# Patient Record
Sex: FEMALE | Race: WHITE | NOT HISPANIC OR LATINO | Employment: UNEMPLOYED | URBAN - NONMETROPOLITAN AREA
[De-identification: names, ages, dates, MRNs, and addresses within clinical notes are randomized per-mention and may not be internally consistent; named-entity substitution may affect disease eponyms.]

---

## 2017-10-05 ENCOUNTER — OFFICE VISIT (OUTPATIENT)
Dept: OBSTETRICS AND GYNECOLOGY | Facility: CLINIC | Age: 22
End: 2017-10-05

## 2017-10-05 VITALS
SYSTOLIC BLOOD PRESSURE: 110 MMHG | HEIGHT: 62 IN | WEIGHT: 180 LBS | BODY MASS INDEX: 33.13 KG/M2 | DIASTOLIC BLOOD PRESSURE: 70 MMHG

## 2017-10-05 DIAGNOSIS — N93.0 POSTCOITAL BLEEDING: Primary | ICD-10-CM

## 2017-10-05 LAB
CANDIDA ALBICANS: NEGATIVE
GARDNERELLA VAGINALIS: POSITIVE
TRICHOMONAS VAGINALIS PCR: NEGATIVE

## 2017-10-05 PROCEDURE — 87510 GARDNER VAG DNA DIR PROBE: CPT | Performed by: NURSE PRACTITIONER

## 2017-10-05 PROCEDURE — 87491 CHLMYD TRACH DNA AMP PROBE: CPT | Performed by: NURSE PRACTITIONER

## 2017-10-05 PROCEDURE — 87591 N.GONORRHOEAE DNA AMP PROB: CPT | Performed by: NURSE PRACTITIONER

## 2017-10-05 PROCEDURE — 99202 OFFICE O/P NEW SF 15 MIN: CPT | Performed by: NURSE PRACTITIONER

## 2017-10-05 PROCEDURE — 87660 TRICHOMONAS VAGIN DIR PROBE: CPT | Performed by: NURSE PRACTITIONER

## 2017-10-05 PROCEDURE — 87480 CANDIDA DNA DIR PROBE: CPT | Performed by: NURSE PRACTITIONER

## 2017-10-05 RX ORDER — METRONIDAZOLE 500 MG/1
500 TABLET ORAL 2 TIMES DAILY
Qty: 14 TABLET | Refills: 0 | Status: SHIPPED | OUTPATIENT
Start: 2017-10-05 | End: 2017-10-12

## 2017-10-05 NOTE — PROGRESS NOTES
"Subjective   History of Present Illness    Veronica Garza is a 21 y.o. female who presents with c/o postcoital bleeding x 1.5 months. She has been sexually active with new partner for 2 months, was last tested for chlamydia/gonorrhea 3 months ago. She is unsure of her partner's sexual history. She notes that the bleeding is very light after intercourse.       Current contraception: OCP (estrogen/progesterone)  Sexually active?: Yes  Postmenopausal?: No  HRT?: no  Hysterectomy?: no    /70  Ht 62\" (157.5 cm)  Wt 180 lb (81.6 kg)  LMP 2017 (Exact Date)  Breastfeeding? No  BMI 32.92 kg/m2    Past Medical History:   Diagnosis Date   • Cough    • Rhinitis    • Upper respiratory infection        No past surgical history on file.    The following portions of the patient's history were reviewed and updated as appropriate: allergies, current medications, past family history, past medical history, past social history, past surgical history and problem list.    Review of Systems    Constitutional:  No fatigue, No weight loss, No weight gain, No fever and No chills   Respiratory: No dyspnea, No cough, No hemopytysis, No wheezing and No pleuritic pain   Cardiovascular: No chest pain, No palpitations, No arrhythmia, No orthopnea, No noctural dyspnea, No edema and No claudication   Breasts: No discharge from nipple, No breast tenderness and No breast mass   Gastrointestinal: No loss of appetite, No dysphagia, No abdominal pain, No nausea, No vomiting, No change in bowel habits, No diarrhea, No constipation and No blood in stool   Genitourinary: No increased frequency of urination, No dysuria, No hematuria, No nocturia, No urinary incontinence, No vaginal discharge, abnormal vaginal bleeding, No pelvic pain, No menstrual problem and No menopausal problem   Skin: No skin rash, No skin lesion, No dry skin, No pruritus and No nail problem   Neurologic: No headache, No dizziness, No lightheadedness, No " syncope, No vertigo, No weakness, No numbness, No tremor and No paresthesia   Psychiatric: No difficulty sleeping, No mood swings, No feeling anxious, No confusion and No memory loss          Objective   Physical Exam    General:  Alert and oriented x 3, Cooperative, Well developed & well nourished and No acute distress   Genitourinary: Vulva normal, vaginal mucosa normal, bladder nondistended and nontender, cervix normal, uterus normal size and nontender, no adnexal/pelvic tenderness or masses, Bartholin's/[place]/Urethral gland WNL, vaginal discharge   Skin: Skin warm and dry and Pattern of hair growth normal       Assessment/Plan   Veronica was seen today for personal problem.    Diagnoses and all orders for this visit:    Postcoital bleeding  -     Gardnerella vaginalis, Trichomonas vaginalis, Candida albicans, PCR - Swab, Vagina  -     Chlamydia trachomatis, Neisseria gonorrhoeae, PCR - Swab, Vagina      All questions answered.  Will rule out vaginal infections.  Follow up if symptoms persist or worsen.

## 2017-10-07 LAB
C TRACH RRNA CVX QL NAA+PROBE: NOT DETECTED
N GONORRHOEA RRNA SPEC QL NAA+PROBE: NOT DETECTED

## 2018-03-15 ENCOUNTER — OFFICE VISIT (OUTPATIENT)
Dept: FAMILY MEDICINE CLINIC | Facility: CLINIC | Age: 23
End: 2018-03-15

## 2018-03-15 VITALS
DIASTOLIC BLOOD PRESSURE: 64 MMHG | WEIGHT: 218.25 LBS | BODY MASS INDEX: 40.16 KG/M2 | HEIGHT: 62 IN | HEART RATE: 88 BPM | OXYGEN SATURATION: 98 % | SYSTOLIC BLOOD PRESSURE: 110 MMHG

## 2018-03-15 DIAGNOSIS — M54.6 ACUTE BILATERAL THORACIC BACK PAIN: ICD-10-CM

## 2018-03-15 DIAGNOSIS — F41.9 ANXIETY: Primary | ICD-10-CM

## 2018-03-15 PROCEDURE — 99203 OFFICE O/P NEW LOW 30 MIN: CPT | Performed by: FAMILY MEDICINE

## 2018-03-15 RX ORDER — BUSPIRONE HYDROCHLORIDE 5 MG/1
5 TABLET ORAL 2 TIMES DAILY
Qty: 60 TABLET | Refills: 3 | Status: SHIPPED | OUTPATIENT
Start: 2018-03-15 | End: 2019-03-22

## 2018-03-15 RX ORDER — MELOXICAM 15 MG/1
15 TABLET ORAL DAILY
Qty: 30 TABLET | Refills: 0 | Status: SHIPPED | OUTPATIENT
Start: 2018-03-15 | End: 2019-03-22

## 2018-03-15 RX ORDER — HYDROXYZINE HYDROCHLORIDE 25 MG/1
25 TABLET, FILM COATED ORAL 3 TIMES DAILY PRN
Qty: 90 TABLET | Refills: 3 | Status: SHIPPED | OUTPATIENT
Start: 2018-03-15 | End: 2019-03-22

## 2018-03-15 NOTE — PROGRESS NOTES
Subjective:     Veronica Garza is a 22 y.o. female who presents for initial evaluation for back pain  Back Pain  Patient presents for evaluation of low back problems. Symptoms have been present for 1 week and include pain in upper back . Initial inciting event: none. Symptoms are worse at random times throughout the day. Alleviating factors identifiable by the patient are popping her back, or laying flat. Aggravating factors identifiable by the patient are bending/using arms. Treatments initiated by the patient: Ibuprofen, and heat. Previous work up: none. Previous treatments: none.    ANXIETY HPI:  Veronica Garza is a 22 y.o. female who presents for initial evaluation of anxiety.       General:  Onset of symptoms: were gradual in onset  Duration of symptoms: Several year(s)  Concerns/Stressors: Work   Current diagnosis: anxiety disorder    Anxiety Symptoms:   Feeling anxious, Feeling worried, Sleep disturbance and Difficulty concentrating  General Anxiety Disorder Symptoms:   Excessive anxiety or worry , Experiencing anxiety lasting more than 6 months, Anxiety is out of proportion to events, Worry is pervasive or difficult to control and Difficulty concentrating  Panic Attack Symptoms:   none in 1 month. Heart races, difficulty with speech and crying. *  Panic Disorder Symptoms:   Worry about panic attacks. *  Specific Anxiety Symptoms:   Fear and anxiety in social situations and Fear of specific object or situation  Associated Symptoms:   Lack of energy and Difficulty functioning at home or work  Comorbid Conditions:   Marijuanna use       Preventative:  Over the past 2 weeks, have you felt down, depressed, or hopeless?Yes   Over the past 2 weeks, have you felt little interest or pleasure in doing things?Yes  Clinical depression screening refused by patient.No     On osteoporosis therapy?Not Indicated     Past Medical Hx:  Past Medical History:   Diagnosis Date   • Cough    • Rhinitis    • Upper  respiratory infection        Past Surgical Hx:  No past surgical history on file.    Health Maintenance:  Health Maintenance   Topic Date Due   • HPV VACCINES (1 of 3 - Female 3 Dose Series) 10/14/2008   • TDAP/TD VACCINES (1 - Tdap) 10/14/2016   • INFLUENZA VACCINE  08/01/2017       Current Meds:    Current Outpatient Prescriptions:   •  norgestrel-ethinyl estradiol (LOW-OGESTREL,CRYSELLE) 0.3-30 MG-MCG per tablet, Take 1 tablet by mouth Daily., Disp: , Rfl:     Allergies:  Review of patient's allergies indicates no known allergies.    Family Hx:  No family history on file.     Social History:  Social History     Social History   • Marital status: Single     Spouse name: N/A   • Number of children: N/A   • Years of education: N/A     Occupational History   • Not on file.     Social History Main Topics   • Smoking status: Never Smoker   • Smokeless tobacco: Never Used   • Alcohol use No   • Drug use: No   • Sexual activity: Yes     Partners: Male     Birth control/ protection: OCP     Other Topics Concern   • Not on file     Social History Narrative   • No narrative on file       Review of Systems  General:negative for - chills, fatigue, fever, hot flashes, malaise, night sweats, weight gain or weight loss  Psychological: negative for - depression, sleep disturbances or suicidal ideation. Positive for anxiety  Ophthalmic: negative for - blurry vision or loss of vision  ENT: negative for - hearing change, nasal congestion or sore throat  Hematological and Lymphatic: negative for - jaundice  Endocrine: negative for - hair pattern changes, skin changes or temperature intolerance  Respiratory: no cough, shortness of breath, or wheezing  Cardiovascular: no chest pain, edema or dyspnea on exertion  Gastrointestinal: no  Nausea/vomiting, abdominal pain, change in bowel habits, or black or bloody stools  Genito-Urinary: no dysuria, trouble voiding, or hematuria  Musculoskeletal: positive for back pain   Neurological:  "negative for - dizziness, headaches, numbness/tingling or seizures  Dermatological: negative for rash and skin lesion changes      Objective:     /64 (BP Location: Left arm, Patient Position: Sitting, Cuff Size: Adult)   Pulse 88   Ht 157.5 cm (62\")   Wt 99 kg (218 lb 4 oz)   SpO2 98%   BMI 39.92 kg/m²     Physical Exam     General Appearance:    Alert, cooperative, no distress, appears stated age   Head:    Normocephalic, without obvious abnormality, atraumatic   Eyes:    PERRL, conjunctiva/corneas clear, EOM's intact   Ears:    Normal external ear canals, both ears   Nose:   Nares normal, septum midline, mucosa normal, no drainage     or sinus tenderness   Throat:   Lips, mucosa, and tongue normal; teeth and gums normal   Neck:   Supple, symmetrical, trachea midline, no adenopathy;     thyroid:  no enlargement/tenderness/nodules; no carotid    bruit   Back:     Symmetric, no curvature, ROM normal, no CVA tenderness   Lungs:     Clear to auscultation bilaterally, respirations unlabored   Chest Wall:    No tenderness or deformity    Heart:    Regular rate and rhythm, S1 and S2 normal, no murmur, rub    or gallop   Abdomen:     Soft, non-tender, bowel sounds active all four quadrants,     no masses, no organomegaly   Extremities:   Extremities normal, atraumatic, no cyanosis or edema   Pulses:   2+ and symmetric all extremities   Skin:   Skin color, texture, turgor normal, no rashes or lesions   Lymph nodes:   Cervical, supraclavicular, and axillary nodes normal   Neurologic:   CNII-XII grossly intact              Assessment/Plan:      Diagnosis Plan   1. Anxiety  CBC & Differential    Comprehensive Metabolic Panel    TSH    T4, Free   2. Acute bilateral thoracic back pain            Follow-up:     Return in about 6 weeks (around 4/26/2018).    Goals     • Less pain     • Less sadness/anxiety             Preventative:  Recommended HPV, TDAp and Influenza vaccine: Patient declined     Does not smoke " cigarretts. Smokes THC. Counseling given  does not drink  eat more fruits and vegetables, decrease soda or juice intake, increase water intake, increase physical activity, reduce screen time, reduce portion size, cut out extra servings, reduce fast food intake, family to eat at dinner table more often, keep TV off during meals, plan meals, eat breakfast and have 3 meals a day    RISK SCORE: 4    Signature   Elisa Garner M.D.  Family Medicine Resident, PGY III  [address] [place] [address]  [phone]          This document has been electronically signed by Elisa Garner MD on March 15, 2018 4:33 PM

## 2018-03-20 ENCOUNTER — APPOINTMENT (OUTPATIENT)
Dept: LAB | Facility: HOSPITAL | Age: 23
End: 2018-03-20

## 2018-03-20 LAB
ALBUMIN SERPL-MCNC: 4 G/DL (ref 3.4–4.8)
ALBUMIN/GLOB SERPL: 1.2 G/DL (ref 1.1–1.8)
ALP SERPL-CCNC: 89 U/L (ref 38–126)
ALT SERPL W P-5'-P-CCNC: 28 U/L (ref 9–52)
ANION GAP SERPL CALCULATED.3IONS-SCNC: 13 MMOL/L (ref 5–15)
AST SERPL-CCNC: 50 U/L (ref 14–36)
BASOPHILS # BLD AUTO: 0.04 10*3/MM3 (ref 0–0.2)
BASOPHILS NFR BLD AUTO: 0.5 % (ref 0–2)
BILIRUB SERPL-MCNC: 0.3 MG/DL (ref 0.2–1.3)
BUN BLD-MCNC: 15 MG/DL (ref 7–21)
BUN/CREAT SERPL: 25.4 (ref 7–25)
CALCIUM SPEC-SCNC: 9.4 MG/DL (ref 8.4–10.2)
CHLORIDE SERPL-SCNC: 103 MMOL/L (ref 95–110)
CO2 SERPL-SCNC: 25 MMOL/L (ref 22–31)
CREAT BLD-MCNC: 0.59 MG/DL (ref 0.5–1)
DEPRECATED RDW RBC AUTO: 39.9 FL (ref 36.4–46.3)
EOSINOPHIL # BLD AUTO: 0.18 10*3/MM3 (ref 0–0.7)
EOSINOPHIL NFR BLD AUTO: 2.4 % (ref 0–7)
ERYTHROCYTE [DISTWIDTH] IN BLOOD BY AUTOMATED COUNT: 13.2 % (ref 11.5–14.5)
GFR SERPL CREATININE-BSD FRML MDRD: 130 ML/MIN/1.73 (ref 60–165)
GLOBULIN UR ELPH-MCNC: 3.3 GM/DL (ref 2.3–3.5)
GLUCOSE BLD-MCNC: 84 MG/DL (ref 60–100)
HCT VFR BLD AUTO: 39.7 % (ref 35–45)
HGB BLD-MCNC: 13 G/DL (ref 12–15.5)
IMM GRANULOCYTES # BLD: 0.02 10*3/MM3 (ref 0–0.02)
IMM GRANULOCYTES NFR BLD: 0.3 % (ref 0–0.5)
LYMPHOCYTES # BLD AUTO: 1.77 10*3/MM3 (ref 0.6–4.2)
LYMPHOCYTES NFR BLD AUTO: 23.5 % (ref 10–50)
MCH RBC QN AUTO: 27 PG (ref 26.5–34)
MCHC RBC AUTO-ENTMCNC: 32.7 G/DL (ref 31.4–36)
MCV RBC AUTO: 82.4 FL (ref 80–98)
MONOCYTES # BLD AUTO: 0.45 10*3/MM3 (ref 0–0.9)
MONOCYTES NFR BLD AUTO: 6 % (ref 0–12)
NEUTROPHILS # BLD AUTO: 5.07 10*3/MM3 (ref 2–8.6)
NEUTROPHILS NFR BLD AUTO: 67.3 % (ref 37–80)
PLATELET # BLD AUTO: 337 10*3/MM3 (ref 150–450)
PMV BLD AUTO: 9.1 FL (ref 8–12)
POTASSIUM BLD-SCNC: 3.7 MMOL/L (ref 3.5–5.1)
PROT SERPL-MCNC: 7.3 G/DL (ref 6.3–8.6)
RBC # BLD AUTO: 4.82 10*6/MM3 (ref 3.77–5.16)
SODIUM BLD-SCNC: 141 MMOL/L (ref 137–145)
T4 FREE SERPL-MCNC: 0.83 NG/DL (ref 0.78–2.19)
TSH SERPL DL<=0.05 MIU/L-ACNC: 1.71 MIU/ML (ref 0.46–4.68)
WBC NRBC COR # BLD: 7.53 10*3/MM3 (ref 3.2–9.8)

## 2018-03-20 PROCEDURE — 85025 COMPLETE CBC W/AUTO DIFF WBC: CPT | Performed by: FAMILY MEDICINE

## 2018-03-20 PROCEDURE — 80053 COMPREHEN METABOLIC PANEL: CPT | Performed by: FAMILY MEDICINE

## 2018-03-20 PROCEDURE — 84439 ASSAY OF FREE THYROXINE: CPT | Performed by: FAMILY MEDICINE

## 2018-03-20 PROCEDURE — 84443 ASSAY THYROID STIM HORMONE: CPT | Performed by: FAMILY MEDICINE

## 2018-03-30 ENCOUNTER — TELEPHONE (OUTPATIENT)
Dept: FAMILY MEDICINE CLINIC | Facility: CLINIC | Age: 23
End: 2018-03-30

## 2018-03-30 NOTE — TELEPHONE ENCOUNTER
Pt called said that she is wanting to to know about lab results.    Said that you can reach her at [phone]

## 2018-08-27 ENCOUNTER — TELEPHONE (OUTPATIENT)
Dept: FAMILY MEDICINE CLINIC | Facility: CLINIC | Age: 23
End: 2018-08-27

## 2019-03-22 ENCOUNTER — CLINICAL SUPPORT (OUTPATIENT)
Dept: OBSTETRICS AND GYNECOLOGY | Facility: CLINIC | Age: 24
End: 2019-03-22

## 2019-03-22 ENCOUNTER — OFFICE VISIT (OUTPATIENT)
Dept: OBSTETRICS AND GYNECOLOGY | Facility: CLINIC | Age: 24
End: 2019-03-22

## 2019-03-22 VITALS
BODY MASS INDEX: 43.79 KG/M2 | SYSTOLIC BLOOD PRESSURE: 108 MMHG | WEIGHT: 238 LBS | HEIGHT: 62 IN | DIASTOLIC BLOOD PRESSURE: 70 MMHG

## 2019-03-22 DIAGNOSIS — Z30.013 INITIATION OF DEPO PROVERA: ICD-10-CM

## 2019-03-22 DIAGNOSIS — Z30.42 SURVEILLANCE FOR DEPO-PROVERA CONTRACEPTION: Primary | ICD-10-CM

## 2019-03-22 DIAGNOSIS — Z01.419 ENCOUNTER FOR ANNUAL ROUTINE GYNECOLOGICAL EXAMINATION: Primary | ICD-10-CM

## 2019-03-22 PROCEDURE — 99395 PREV VISIT EST AGE 18-39: CPT | Performed by: NURSE PRACTITIONER

## 2019-03-22 PROCEDURE — 96372 THER/PROPH/DIAG INJ SC/IM: CPT | Performed by: NURSE PRACTITIONER

## 2019-03-22 PROCEDURE — G0123 SCREEN CERV/VAG THIN LAYER: HCPCS | Performed by: NURSE PRACTITIONER

## 2019-03-22 PROCEDURE — 88141 CYTOPATH C/V INTERPRET: CPT | Performed by: PATHOLOGY

## 2019-03-22 RX ORDER — MEDROXYPROGESTERONE ACETATE 150 MG/ML
150 INJECTION, SUSPENSION INTRAMUSCULAR
Qty: 1 EACH | Refills: 4 | Status: SHIPPED | OUTPATIENT
Start: 2019-03-22 | End: 2019-12-03

## 2019-03-22 RX ORDER — MEDROXYPROGESTERONE ACETATE 150 MG/ML
150 INJECTION, SUSPENSION INTRAMUSCULAR ONCE
Status: COMPLETED | OUTPATIENT
Start: 2019-03-22 | End: 2019-03-22

## 2019-03-22 RX ADMIN — MEDROXYPROGESTERONE ACETATE 150 MG: 150 INJECTION, SUSPENSION INTRAMUSCULAR at 12:00

## 2019-03-22 NOTE — PROGRESS NOTES
Subjective   Veronica Garza is a 23 y.o. Annual gyn exam, desires Depo    LMP: 19  Pap: never    Sometimes forgets to take OCP and desires Depo injections.  She has take several home pregnancy tests and they were all negative.  Periods usually regular every month, flow light to moderate, painful menstrual cramps relieved by ibuprofen, changing pad every 2 hours for cleanliness purposes,  last 4-7 days.      Gynecologic Exam   The patient's pertinent negatives include no genital itching, genital lesions, genital odor, genital rash, missed menses, pelvic pain, vaginal bleeding or vaginal discharge. The patient is experiencing no pain. She is not pregnant. Pertinent negatives include no abdominal pain, anorexia, back pain, chills, constipation, diarrhea, discolored urine, dysuria, fever, flank pain, frequency, headaches, hematuria, joint pain, joint swelling, nausea, painful intercourse, rash, sore throat, urgency or vomiting. She is sexually active. No, her partner does not have an STD. She uses oral contraceptives for contraception. Her menstrual history has been regular. Her past medical history is significant for vaginosis. There is no history of an abdominal surgery, a  section, an ectopic pregnancy, endometriosis, a gynecological surgery, herpes simplex, menorrhagia, metrorrhagia, miscarriage, ovarian cysts, perineal abscess, PID, an STD or a terminated pregnancy.       The following portions of the patient's history were reviewed and updated as appropriate: allergies, current medications, past family history, past medical history, past social history, past surgical history and problem list.    Review of Systems   Constitutional: Negative for chills, fatigue, fever and unexpected weight change.   HENT: Negative for sore throat.    Respiratory: Negative for apnea, chest tightness and shortness of breath.    Cardiovascular: Negative for chest pain, palpitations and leg swelling.    Gastrointestinal: Negative for abdominal pain, anorexia, constipation, diarrhea, nausea and vomiting.   Genitourinary: Negative for difficulty urinating, dyspareunia, dysuria, enuresis, flank pain, frequency, genital sores, hematuria, menorrhagia, menstrual problem, missed menses, pelvic pain, urgency, vaginal bleeding, vaginal discharge and vaginal pain.   Musculoskeletal: Negative for back pain and joint pain.   Skin: Negative for rash.   Neurological: Negative for headaches.   Psychiatric/Behavioral: Negative for sleep disturbance.         Objective   Physical Exam   Constitutional: She is oriented to person, place, and time. Vital signs are normal. She appears well-developed and well-nourished. No distress.   Neck: No thyromegaly present.   Cardiovascular: Normal rate, regular rhythm, normal heart sounds and intact distal pulses.   Pulmonary/Chest: Effort normal and breath sounds normal. Right breast exhibits no inverted nipple, no mass, no nipple discharge, no skin change and no tenderness. Left breast exhibits no inverted nipple, no mass, no nipple discharge, no skin change and no tenderness. Breasts are symmetrical.   Abdominal: Soft. Bowel sounds are normal. She exhibits no distension. There is no tenderness.   Genitourinary: Vagina normal and uterus normal. No breast discharge or bleeding. Pelvic exam was performed with patient supine. There is no rash, tenderness, lesion or injury on the right labia. There is no rash, tenderness, lesion or injury on the left labia. Cervix exhibits no motion tenderness, no discharge and no friability. Right adnexum displays no mass, no tenderness and no fullness. Left adnexum displays no mass, no tenderness and no fullness.   Genitourinary Comments: Pap obtained.   Lymphadenopathy:     She has no axillary adenopathy.        Right: No inguinal adenopathy present.        Left: No inguinal adenopathy present.   Neurological: She is alert and oriented to person, place, and  time.   Skin: Skin is warm, dry and intact. She is not diaphoretic.   Psychiatric: She has a normal mood and affect. Her speech is normal and behavior is normal.   Nursing note and vitals reviewed.        Assessment/Plan   Veronica was seen today for menstrual problem.    Diagnoses and all orders for this visit:    Encounter for annual routine gynecological examination  -     Liquid-based Pap Smear, Screening    Initiation of Depo Provera  -     medroxyPROGESTERone (DEPO-PROVERA) 150 MG/ML injection; Inject 1 mL into the appropriate muscle as directed by prescriber Every 3 (Three) Months.    RBA of Depo.  Pt understands risks/benefits and agrees to hormonal medication regimen.  Pt educated and encouraged to continue monthly SBEs.  Pap letter will be mailed out in about 2 weeks.  RTC every 3 months for injection appts, then in one year for annual gyn exam or sooner if needed.  All pt questions and concerns addressed.

## 2019-03-27 LAB
GEN CATEG CVX/VAG CYTO-IMP: ABNORMAL
LAB AP CASE REPORT: ABNORMAL
LAB AP GYN ADDITIONAL INFORMATION: ABNORMAL
PATH INTERP SPEC-IMP: ABNORMAL
STAT OF ADQ CVX/VAG CYTO-IMP: ABNORMAL

## 2019-04-24 ENCOUNTER — PROCEDURE VISIT (OUTPATIENT)
Dept: OBSTETRICS AND GYNECOLOGY | Facility: CLINIC | Age: 24
End: 2019-04-24

## 2019-04-24 VITALS
WEIGHT: 238 LBS | SYSTOLIC BLOOD PRESSURE: 122 MMHG | BODY MASS INDEX: 43.79 KG/M2 | DIASTOLIC BLOOD PRESSURE: 85 MMHG | HEIGHT: 62 IN

## 2019-04-24 DIAGNOSIS — R87.611 ATYPICAL SQUAMOUS CELLS CANNOT EXCLUDE HIGH GRADE SQUAMOUS INTRAEPITHELIAL LESION ON CYTOLOGIC SMEAR OF CERVIX (ASC-H): Primary | ICD-10-CM

## 2019-04-24 PROCEDURE — 57452 EXAM OF CERVIX W/SCOPE: CPT | Performed by: OBSTETRICS & GYNECOLOGY

## 2019-04-24 NOTE — PROGRESS NOTES
Colposcopy    Date of procedure:  4/24/2019    Risks and benefits discussed? yes  All questions answered? yes  Consents given by the patient  Written consent obtained? yes    Local anesthesia used:  no    Pre-op indication: ASC-H  Procedure documentation:    The cervix was next bathed in acetic acid. The cervix was  viewed colposcopically with white and green light.     The findings were as follows:      The transformation zone was able to be seen adequately.    No visible lesions    Ectocervical biopsies not obtained..    An ECC was not performed.      Colposcopic Impression: 1. Adequate colposcopy  2. Colposcopic findings are inconsistent with PAP       Plan: Discussed findings and will need repeat pap/colpo every six months.      The patient received education regarding abnormal pap smears and the role of HPV with cervical dysplasia and cervical cancer.  We discussed that tobacco use increases the risks of persistently abnormal pap smears and invasive cervical cancer.  We have discussed that unprotected sexual intercourse, especially with multiple sexual partners increases the risks of HPV acquisition and cervical cancer.  I have recommended compliance with pap smear follow up and regular use of condoms.  She voices understanding.    This note was electronically signed.  Arpita Hung MD   April 24, 2019

## 2019-06-12 ENCOUNTER — CLINICAL SUPPORT (OUTPATIENT)
Dept: OBSTETRICS AND GYNECOLOGY | Facility: CLINIC | Age: 24
End: 2019-06-12

## 2019-06-12 DIAGNOSIS — Z30.42 SURVEILLANCE FOR DEPO-PROVERA CONTRACEPTION: Primary | ICD-10-CM

## 2019-06-12 PROCEDURE — 96372 THER/PROPH/DIAG INJ SC/IM: CPT | Performed by: OBSTETRICS & GYNECOLOGY

## 2019-06-12 RX ORDER — MEDROXYPROGESTERONE ACETATE 150 MG/ML
150 INJECTION, SUSPENSION INTRAMUSCULAR ONCE
Status: COMPLETED | OUTPATIENT
Start: 2019-06-12 | End: 2019-06-12

## 2019-06-12 RX ADMIN — MEDROXYPROGESTERONE ACETATE 150 MG: 150 INJECTION, SUSPENSION INTRAMUSCULAR at 15:10

## 2019-08-21 ENCOUNTER — OFFICE VISIT (OUTPATIENT)
Dept: OBSTETRICS AND GYNECOLOGY | Facility: CLINIC | Age: 24
End: 2019-08-21

## 2019-08-21 VITALS
SYSTOLIC BLOOD PRESSURE: 109 MMHG | DIASTOLIC BLOOD PRESSURE: 77 MMHG | WEIGHT: 242 LBS | HEIGHT: 62 IN | BODY MASS INDEX: 44.53 KG/M2

## 2019-08-21 DIAGNOSIS — N92.1 BREAKTHROUGH BLEEDING ON DEPO-PROVERA: ICD-10-CM

## 2019-08-21 DIAGNOSIS — R87.611 ATYPICAL SQUAMOUS CELLS CANNOT EXCLUDE HIGH GRADE SQUAMOUS INTRAEPITHELIAL LESION ON CYTOLOGIC SMEAR OF CERVIX (ASC-H): Primary | ICD-10-CM

## 2019-08-21 PROCEDURE — 88141 CYTOPATH C/V INTERPRET: CPT | Performed by: PATHOLOGY

## 2019-08-21 PROCEDURE — 99213 OFFICE O/P EST LOW 20 MIN: CPT | Performed by: NURSE PRACTITIONER

## 2019-08-21 PROCEDURE — 87624 HPV HI-RISK TYP POOLED RSLT: CPT | Performed by: NURSE PRACTITIONER

## 2019-08-21 PROCEDURE — G0123 SCREEN CERV/VAG THIN LAYER: HCPCS | Performed by: NURSE PRACTITIONER

## 2019-08-21 RX ORDER — DOXYCYCLINE HYCLATE 100 MG
TABLET ORAL EVERY 12 HOURS SCHEDULED
COMMUNITY
End: 2020-01-07

## 2019-08-21 NOTE — PROGRESS NOTES
Subjective   Veronica Garza is a 23 y.o. Breakthrough bleeding on Depo-Provera, f/u pap smear     LMP: 03/19  Pap: 03/19, ASCUS- can't exclude HGSIL    Pt presents for follow up pap smear and breakthrough bleeding while on Depo Provera.  She reports vaginal bleeding has lasted about 10 days, but is starting to lighten up.        Vaginal Bleeding   The patient's primary symptoms include vaginal bleeding. The patient's pertinent negatives include no genital itching, genital lesions, genital odor, genital rash, missed menses, pelvic pain or vaginal discharge. This is a new problem. The current episode started 1 to 4 weeks ago. The problem occurs daily. The problem has been gradually improving. The patient is experiencing no pain. She is not pregnant. Pertinent negatives include no abdominal pain, anorexia, back pain, chills, constipation, diarrhea, discolored urine, dysuria, fever, flank pain, frequency, headaches, hematuria, joint pain, joint swelling, nausea, rash, sore throat, urgency or vomiting. The vaginal discharge was bloody. The vaginal bleeding is typical of menses. She has not been passing clots. She has not been passing tissue. She uses progestin injections for contraception. Her menstrual history has been irregular.       The following portions of the patient's history were reviewed and updated as appropriate: allergies, current medications, past family history, past medical history, past social history, past surgical history and problem list.    Review of Systems   Constitutional: Negative for chills, diaphoresis, fatigue, fever and unexpected weight change.   HENT: Negative for sore throat.    Respiratory: Negative for apnea, chest tightness and shortness of breath.    Cardiovascular: Negative for chest pain and palpitations.   Gastrointestinal: Negative for abdominal distention, abdominal pain, anorexia, constipation, diarrhea, nausea and vomiting.   Genitourinary: Positive for vaginal bleeding.  Negative for decreased urine volume, difficulty urinating, dysuria, enuresis, flank pain, frequency, genital sores, hematuria, menstrual problem, missed menses, pelvic pain, urgency, vaginal discharge and vaginal pain.   Musculoskeletal: Negative for back pain and joint pain.   Skin: Negative for rash.   Neurological: Negative for headaches.   Psychiatric/Behavioral: Negative for sleep disturbance.       Objective   Physical Exam   Constitutional: She is oriented to person, place, and time. Vital signs are normal. She appears well-developed and well-nourished. No distress.   Cardiovascular: Normal rate, regular rhythm and normal heart sounds.   Pulmonary/Chest: Effort normal and breath sounds normal.   Genitourinary: Rectum normal. Pelvic exam was performed with patient supine. There is no rash, tenderness, lesion or injury on the right labia. There is no rash, tenderness, lesion or injury on the left labia. Cervix exhibits no motion tenderness, no discharge and no friability. There is bleeding in the vagina.   Genitourinary Comments: Pap obtained   Neurological: She is alert and oriented to person, place, and time.   Skin: Skin is warm and dry. She is not diaphoretic.   Psychiatric: She has a normal mood and affect. Her behavior is normal.   Nursing note and vitals reviewed.        Assessment/Plan   Veronica was seen today for abnormal bleeding.    Diagnoses and all orders for this visit:    Atypical squamous cells cannot exclude high grade squamous intraepithelial lesion on cytologic smear of cervix (ASC-H)  -     Liquid-based Pap Smear, Screening    Breakthrough bleeding on Depo-Provera        Pt educated on breakthrough bleeding with Depo Provera, gave her premarin 14 day sample if she experiences another episode of bleeding.  Will send pap letter via mail, if abnormal we will call her.  RTC if symptoms worsen or fail to improve.

## 2019-08-30 LAB — HPV I/H RISK 4 DNA CVX QL PROBE+SIG AMP: NEGATIVE

## 2019-09-04 ENCOUNTER — CLINICAL SUPPORT (OUTPATIENT)
Dept: OBSTETRICS AND GYNECOLOGY | Facility: CLINIC | Age: 24
End: 2019-09-04

## 2019-09-04 DIAGNOSIS — Z30.42 SURVEILLANCE FOR DEPO-PROVERA CONTRACEPTION: Primary | ICD-10-CM

## 2019-09-04 PROCEDURE — 96372 THER/PROPH/DIAG INJ SC/IM: CPT | Performed by: NURSE PRACTITIONER

## 2019-09-04 RX ORDER — MEDROXYPROGESTERONE ACETATE 150 MG/ML
150 INJECTION, SUSPENSION INTRAMUSCULAR ONCE
Status: COMPLETED | OUTPATIENT
Start: 2019-09-04 | End: 2019-09-04

## 2019-09-04 RX ADMIN — MEDROXYPROGESTERONE ACETATE 150 MG: 150 INJECTION, SUSPENSION INTRAMUSCULAR at 14:58

## 2019-11-26 RX ORDER — MEDROXYPROGESTERONE ACETATE 150 MG/ML
150 INJECTION, SUSPENSION INTRAMUSCULAR
Qty: 1 ML | Refills: 3 | Status: SHIPPED | OUTPATIENT
Start: 2019-11-26 | End: 2019-12-03

## 2019-12-03 ENCOUNTER — OFFICE VISIT (OUTPATIENT)
Dept: OBSTETRICS AND GYNECOLOGY | Facility: CLINIC | Age: 24
End: 2019-12-03

## 2019-12-03 VITALS
SYSTOLIC BLOOD PRESSURE: 130 MMHG | DIASTOLIC BLOOD PRESSURE: 96 MMHG | WEIGHT: 251 LBS | BODY MASS INDEX: 46.19 KG/M2 | HEIGHT: 62 IN

## 2019-12-03 DIAGNOSIS — Z30.430 ENCOUNTER FOR IUD INSERTION: Primary | ICD-10-CM

## 2019-12-03 DIAGNOSIS — Z11.3 SCREENING FOR STD (SEXUALLY TRANSMITTED DISEASE): ICD-10-CM

## 2019-12-03 LAB
B-HCG UR QL: NEGATIVE
INTERNAL NEGATIVE CONTROL: NEGATIVE
INTERNAL POSITIVE CONTROL: POSITIVE
Lab: NORMAL

## 2019-12-03 PROCEDURE — 81025 URINE PREGNANCY TEST: CPT | Performed by: NURSE PRACTITIONER

## 2019-12-03 PROCEDURE — 58300 INSERT INTRAUTERINE DEVICE: CPT | Performed by: NURSE PRACTITIONER

## 2019-12-03 PROCEDURE — 87661 TRICHOMONAS VAGINALIS AMPLIF: CPT | Performed by: NURSE PRACTITIONER

## 2019-12-03 PROCEDURE — 87491 CHLMYD TRACH DNA AMP PROBE: CPT | Performed by: NURSE PRACTITIONER

## 2019-12-03 PROCEDURE — 87591 N.GONORRHOEAE DNA AMP PROB: CPT | Performed by: NURSE PRACTITIONER

## 2019-12-03 NOTE — PROGRESS NOTES
IUD Insertion NDC# [id number]    Pt is spotting frequently with Depo-Provera. Desires the Mirena. Negative pregnancy test today.  Urine GC today. Currently on menses; flow heavy the last 3-4 days.     Date of procedure:  12/3/2019    Risks and benefits discussed? yes  All questions answered? yes  Consents given by The patient  Written consent obtained? yes    Local anesthesia used:  no    Procedure documentation:    After verifying the patient had a low probability of being pregnant and met the criteria for insertion, a speculum has placed and the cervix was cleansed with an antiseptic solution.  The anterior lip of the cervix was grasped and the uterine cavity was gently sounded. There was mild difficulty passing the sound through the cervix.  Cervical dilation did not need to be performed prior to placing the IUD.  The uterus was anteverted and sounded to 7 cms. The Mirena was then prepared per the manufacturers instructions.    The Mirena was advanced to a point 2 cms from the fundus and then the arms were released from the sheath.  The device was advanced to the fundus and the device was released fully from the sheath.. The string was cut 3 cms in length.  Bleeding from the cervix was scant.    She tolerated the procedure without any difficulty.    Post procedure instructions: Call if any fever or excessive bleeding or pain.    Follow up needed: PRN for problems; return in 1 month of string check.     This note was electronically signed.    Jessenia Hernandez, CHARLIE  December 3, 2019

## 2019-12-04 LAB
C TRACH RRNA CVX QL NAA+PROBE: NEGATIVE
N GONORRHOEA RRNA SPEC QL NAA+PROBE: NEGATIVE
TRICHOMONAS VAGINALIS PCR: NEGATIVE

## 2020-01-07 ENCOUNTER — OFFICE VISIT (OUTPATIENT)
Dept: OBSTETRICS AND GYNECOLOGY | Facility: CLINIC | Age: 25
End: 2020-01-07

## 2020-01-07 VITALS
BODY MASS INDEX: 46.01 KG/M2 | HEIGHT: 62 IN | DIASTOLIC BLOOD PRESSURE: 80 MMHG | SYSTOLIC BLOOD PRESSURE: 130 MMHG | WEIGHT: 250 LBS

## 2020-01-07 DIAGNOSIS — Z30.431 ENCOUNTER FOR ROUTINE CHECKING OF INTRAUTERINE CONTRACEPTIVE DEVICE (IUD): Primary | ICD-10-CM

## 2020-01-07 PROCEDURE — 99212 OFFICE O/P EST SF 10 MIN: CPT | Performed by: NURSE PRACTITIONER

## 2020-01-07 NOTE — PROGRESS NOTES
Subjective   Veronica Garza is a 24 y.o. here for a IUD string check    Pt reports she has been bleeding every day since getting the Mirena. Some days she goes through several pads. She reports cramping associated with the vaginal bleeding.     Contraception   This is a new problem. The current episode started more than 1 month ago. The problem has been waxing and waning. Pertinent negatives include no abdominal pain, fatigue, fever, headaches, vomiting or weakness. Nothing aggravates the symptoms. She has tried nothing for the symptoms.       The following portions of the patient's history were reviewed and updated as appropriate: allergies, current medications, past family history, past medical history, past social history, past surgical history and problem list.    Review of Systems   Constitutional: Negative for fatigue and fever.   Gastrointestinal: Negative for abdominal pain and vomiting.   Genitourinary: Positive for menstrual problem. Negative for vaginal discharge and vaginal pain.        Cramping, bleeding every day with Mirena    Neurological: Negative for weakness.       Objective   Physical Exam   Constitutional: She is oriented to person, place, and time. She appears well-developed and well-nourished.   HENT:   Head: Normocephalic.   Neck: Normal range of motion.   Pulmonary/Chest: Effort normal. Right breast exhibits no inverted nipple, no mass, no nipple discharge, no skin change and no tenderness. Left breast exhibits no inverted nipple, no mass, no nipple discharge, no skin change and no tenderness.   Abdominal: Soft.   Genitourinary:   Genitourinary Comments: IUD string visible and at appropriate length. Small amount of blood noted.    Musculoskeletal: Normal range of motion.   Neurological: She is alert and oriented to person, place, and time.   Skin: Skin is warm and dry.   Psychiatric: She has a normal mood and affect. Her behavior is normal.   Nursing note and vitals  reviewed.        Assessment/Plan   Veronica was seen today for contraception.    Diagnoses and all orders for this visit:    Encounter for routine checking of intrauterine contraceptive device (IUD)        Ibuprofen can help with cramping and decrease amount of bleeding. Follow up in a month if vaginal bleeding everyday persist. Return as needed.

## 2020-02-04 ENCOUNTER — OFFICE VISIT (OUTPATIENT)
Dept: OBSTETRICS AND GYNECOLOGY | Facility: CLINIC | Age: 25
End: 2020-02-04

## 2020-02-04 VITALS
SYSTOLIC BLOOD PRESSURE: 126 MMHG | HEIGHT: 62 IN | WEIGHT: 252 LBS | BODY MASS INDEX: 46.38 KG/M2 | DIASTOLIC BLOOD PRESSURE: 78 MMHG

## 2020-02-04 DIAGNOSIS — N92.1 PROLONGED MENSTRUAL CYCLE: Primary | ICD-10-CM

## 2020-02-04 DIAGNOSIS — E28.2 PCOS (POLYCYSTIC OVARIAN SYNDROME): ICD-10-CM

## 2020-02-04 PROCEDURE — 99212 OFFICE O/P EST SF 10 MIN: CPT | Performed by: NURSE PRACTITIONER

## 2020-02-04 NOTE — PROGRESS NOTES
Subjective   Veronica Garza is a 24 y.o. here for follow-up on prolonged period with Mirena    Pt reports she is still bleeding on most days but feels the amount has decreased. Pt had a Mirena placed on 12/3/2019. Reports her menstrual cycles use to be regular occurring every month lasting for 5-7 days before using Depo-Provera. However, pt has cystic acne with some facial hair; the cystic acne is a new problem for her. She has also gain 50lbs in a 2 year period. Pt reports her mother and sister has PCOS; pt is wondering if she has PCOS.     Menstrual Problem   This is a chronic problem. The current episode started more than 1 month ago. The problem occurs constantly. The problem has been gradually improving. Pertinent negatives include no abdominal pain, chest pain, chills, fatigue, fever, nausea, rash, sore throat or weakness. She has tried NSAIDs for the symptoms.       The following portions of the patient's history were reviewed and updated as appropriate: allergies, current medications, past family history, past medical history, past social history, past surgical history and problem list.    Review of Systems   Constitutional: Negative for chills, fatigue, fever, unexpected weight gain and unexpected weight loss.   HENT: Negative for sneezing and sore throat.    Respiratory: Negative for shortness of breath.    Cardiovascular: Negative for chest pain and palpitations.   Gastrointestinal: Negative for abdominal pain, constipation, diarrhea and nausea.   Endocrine: Negative for cold intolerance and heat intolerance.   Genitourinary: Positive for menstrual problem. Negative for breast discharge, breast lump, breast pain, difficulty urinating, dysuria, frequency, pelvic pain, pelvic pressure, urinary incontinence, vaginal bleeding, vaginal discharge and vaginal pain.   Skin: Negative for rash.        Cystic acne   Neurological: Negative for weakness and headache.   Psychiatric/Behavioral: Negative for sleep  disturbance, depressed mood and stress.       Objective   Physical Exam   Constitutional: She is oriented to person, place, and time. She appears well-developed and well-nourished.   HENT:   Head: Normocephalic.   Neck: Normal range of motion.   Pulmonary/Chest: Effort normal.   Abdominal: Soft.   Genitourinary:   Genitourinary Comments: Deferred pelvic exam.    Musculoskeletal: Normal range of motion.   Neurological: She is alert and oriented to person, place, and time.   Skin: Skin is warm and dry.   Psychiatric: She has a normal mood and affect. Her behavior is normal.   Nursing note and vitals reviewed.        Assessment/Plan   Veronica was seen today for follow-up.    Diagnoses and all orders for this visit:    Prolonged menstrual cycle    PCOS (polycystic ovarian syndrome)  -     Insulin, Total  -     Sex Horm Binding Globulin  -     Testosterone, F Eqlib & T LC / MS  -     TSH  -     Hemoglobin A1c        Pt may meet PCOS criteria due to changes in her menstrual cycles and acne/mild hirsutism. However, changes in her cycle may due to use of Depo-Provera and Mirena. Will evaluate for elevated testosterone, insulin resistance, and thyroid disorder. ACOG PCOS handout given. Encouraged weight loss. Encourage pt to allow 6 months of being on Mirena for noticeable improvement in menstrual cycle. Pt is also due for PAP& colposcopy d/t ASC-H in March 2019 (needs 6 month surveillance for up to 2 years). Pt to make an apt when she is not bleeding.

## 2020-02-06 ENCOUNTER — APPOINTMENT (OUTPATIENT)
Dept: LAB | Facility: HOSPITAL | Age: 25
End: 2020-02-06

## 2020-02-06 LAB
HBA1C MFR BLD: 6.1 % (ref 4.8–5.6)
TSH SERPL DL<=0.05 MIU/L-ACNC: 1.17 UIU/ML (ref 0.27–4.2)

## 2020-02-06 PROCEDURE — 84270 ASSAY OF SEX HORMONE GLOBUL: CPT | Performed by: NURSE PRACTITIONER

## 2020-02-06 PROCEDURE — 36415 COLL VENOUS BLD VENIPUNCTURE: CPT | Performed by: NURSE PRACTITIONER

## 2020-02-06 PROCEDURE — 84443 ASSAY THYROID STIM HORMONE: CPT | Performed by: NURSE PRACTITIONER

## 2020-02-06 PROCEDURE — 83525 ASSAY OF INSULIN: CPT | Performed by: NURSE PRACTITIONER

## 2020-02-06 PROCEDURE — 83036 HEMOGLOBIN GLYCOSYLATED A1C: CPT | Performed by: NURSE PRACTITIONER

## 2020-02-07 LAB
INSULIN SERPL-ACNC: 43.9 UIU/ML (ref 2.6–24.9)
SHBG SERPL-SCNC: 10.3 NMOL/L (ref 24.6–122)